# Patient Record
Sex: MALE | Race: WHITE | URBAN - METROPOLITAN AREA
[De-identification: names, ages, dates, MRNs, and addresses within clinical notes are randomized per-mention and may not be internally consistent; named-entity substitution may affect disease eponyms.]

---

## 2022-09-01 ENCOUNTER — OFFICE VISIT (OUTPATIENT)
Dept: ENT CLINIC | Age: 82
End: 2022-09-01
Payer: MEDICARE

## 2022-09-01 VITALS
SYSTOLIC BLOOD PRESSURE: 122 MMHG | HEART RATE: 67 BPM | WEIGHT: 189.2 LBS | OXYGEN SATURATION: 97 % | BODY MASS INDEX: 28.02 KG/M2 | HEIGHT: 69 IN | DIASTOLIC BLOOD PRESSURE: 72 MMHG | RESPIRATION RATE: 20 BRPM

## 2022-09-01 DIAGNOSIS — H91.93 BILATERAL HEARING LOSS, UNSPECIFIED HEARING LOSS TYPE: ICD-10-CM

## 2022-09-01 DIAGNOSIS — R09.81 NASAL CONGESTION: ICD-10-CM

## 2022-09-01 DIAGNOSIS — R42 DIZZINESS: Primary | ICD-10-CM

## 2022-09-01 DIAGNOSIS — J34.89 SINUS PRESSURE: ICD-10-CM

## 2022-09-01 PROCEDURE — G8427 DOCREV CUR MEDS BY ELIG CLIN: HCPCS | Performed by: OTOLARYNGOLOGY

## 2022-09-01 PROCEDURE — 1123F ACP DISCUSS/DSCN MKR DOCD: CPT | Performed by: OTOLARYNGOLOGY

## 2022-09-01 PROCEDURE — 1101F PT FALLS ASSESS-DOCD LE1/YR: CPT | Performed by: OTOLARYNGOLOGY

## 2022-09-01 PROCEDURE — G8536 NO DOC ELDER MAL SCRN: HCPCS | Performed by: OTOLARYNGOLOGY

## 2022-09-01 PROCEDURE — G8417 CALC BMI ABV UP PARAM F/U: HCPCS | Performed by: OTOLARYNGOLOGY

## 2022-09-01 PROCEDURE — G8432 DEP SCR NOT DOC, RNG: HCPCS | Performed by: OTOLARYNGOLOGY

## 2022-09-01 PROCEDURE — 99203 OFFICE O/P NEW LOW 30 MIN: CPT | Performed by: OTOLARYNGOLOGY

## 2022-09-01 RX ORDER — APIXABAN 5 MG/1
5 TABLET, FILM COATED ORAL 2 TIMES DAILY
COMMUNITY
Start: 2022-05-26

## 2022-09-01 RX ORDER — LOSARTAN POTASSIUM 50 MG/1
TABLET ORAL
COMMUNITY
Start: 2022-08-30

## 2022-09-01 RX ORDER — AMLODIPINE BESYLATE 2.5 MG/1
2.5 TABLET ORAL DAILY
COMMUNITY
Start: 2022-07-01

## 2022-09-01 NOTE — PROGRESS NOTES
Chief Complaint   Patient presents with    New Patient     Dizziness    Sinus Pain       Visit Vitals  /72   Pulse 67   Resp 20   Ht 5' 9\" (1.753 m)   Wt 189 lb 3.2 oz (85.8 kg)   SpO2 97%   BMI 27.94 kg/m²

## 2022-09-01 NOTE — PROGRESS NOTES
Otolaryngology-Head and Neck Surgery  New Patient Visit     Patient: Mane Helton  YOB: 1940  MRN: [id number]  Date of Service: 9/1/2022    Chief Complaint:  Dizziness, sinus pressure     History of Present Illness: Mane Helton is a 80y.o. year old male who presents today for discussion of dizziness and sinus pressure    Has a history of chronic sinus pressure, bilateral cheek pain, ongoing for 40+ years  Intermittent, worse with allergies  Has tried nasal sprays which cause throat irritation  Has tried antihistamines or allergy pills but regular use starts to cause pain near his kidneys  Has used sudafed occasionally which does help    He also has had intermittent dizziness, which he describes as a lightheaded sensation, lasting for seconds, when bending over or looking up  Occurs few times / week  Denies room spinning  Feels normal between episodes    Follows with cardiology and has plans for upcoming ablation     Chronic hearing loss, no sudden or recent hearing changes  Has hearing aids through [facility]    Past Medical History:  Past Medical History:   Diagnosis Date    A-fib [facility])        Past Surgical History:   History reviewed. No pertinent surgical history. Medications:   Current Outpatient Medications   Medication Instructions    amLODIPine (NORVASC) 2.5 mg, Oral, DAILY    Eliquis 5 mg, Oral, 2 TIMES DAILY    losartan (COZAAR) 50 mg tablet No dose, route, or frequency recorded. Allergies: Allergies   Allergen Reactions    Sulfa (Sulfonamide Antibiotics) Other (comments)       Social History:   Social History     Tobacco Use    Smoking status: Former     Types: Cigarettes    Smokeless tobacco: Current   Vaping Use    Vaping Use: Never used   Substance Use Topics    Drug use: Never        Family History:  History reviewed. No pertinent family history.     Review of Systems:    Consitutional: denies fever, excessive weight gain or loss. Eyes: denies diplopia, eye pain. Integumentary: denies new concerning skin lesions. Ears, Nose, Mouth, Throat: denies except as per HPI. Endocrine: denies hot or cold intolerance, increased thirst.  Respiratory: denies cough, hemoptysis, wheezing  Gastrointestinal: denies trouble swallowing, nausea, emesis, regurgitation  Musculoskeletal: denies muscle weakness or wasting  Cardiovascular: denies chest pain, shortness of breath  Neurologic: denies seizures, numbness or tingling, syncope  Hematologic: denies easy bleeding or bruising    Physical Examination:   Vitals:    09/01/22 0924   BP: 122/72   Pulse: 67   Resp: 20   Height: 5' 9\" (1.753 m)   Weight: 189 lb 3.2 oz (85.8 kg)   SpO2: 97%        General: Comfortable, pleasant, appears stated age  Voice: Strong, speaking in full sentences, no stridor    Face: No masses or lesions, facial strength symmetric   Ears: External ears unremarkable. Bilateral ear canal clear. Tympanic membrane clear and intact, with visible landmarks. Clear middle ear space  Nose: External nose unremarkable. Dorsum midline. Anterior rhinoscopy demonstrates no lesions. Septum deviation. Turbinates without hypertrophy. Clear rhinorrhea bilaterally   Oral Cavity / Oropharynx: No trismus. Upper dentures no removed. Mucosa pink and moist. No lesions. Tongue is midline and mobile. Palate elevates symmetrically. Uvula midline. Tonsils unremarkable. Base of tongue soft. Floor of mouth soft. Neck: Supple. No adenopathy. Thyroid unremarkable. Palpable laryngeal landmarks. Full neck range of motion   Neurologic: CN II - XI intact. Normal gait. Intermittent strabismus. No spontaneous, gaze induced nystagmus. Nella Pucker negative either direction       Assessment and Plan:     Sinus pressure  Dizziness  Allergic rhinitis  - Intermittent dizziness and lightheadedness. Sounds non otologic in etiology.  Nella Pucker negative today on exam as well   - May be in part multifactorial - pt has orthopedic issues, occ double vision and R eye decreased vision, as well as neuropathy  - He does notice relationship between sinus presure and dizziness  - Does not tolerate nasal sprays or PO medications well  - Can trial flonase sensimist, as its less likely to irritate his throat -discussed trial x 4 weeks  - If sinus pressure and/or dizziness are bothersome or persist, can let us know and can consider additional work up        The patient was instructed to return to clinic if no improvement or progression of symptoms. Signs to watch out for reviewed.       MD Natalio Iqbalova [facility]  [address] [place] [address] [address]  [facility]  Office Phone: [phone]

## 2023-05-24 RX ORDER — AMLODIPINE BESYLATE 2.5 MG/1
2.5 TABLET ORAL DAILY
COMMUNITY
Start: 2022-07-01

## 2023-05-24 RX ORDER — LOSARTAN POTASSIUM 50 MG/1
TABLET ORAL
COMMUNITY
Start: 2022-08-30